# Patient Record
Sex: MALE | Race: BLACK OR AFRICAN AMERICAN | Employment: FULL TIME | URBAN - METROPOLITAN AREA
[De-identification: names, ages, dates, MRNs, and addresses within clinical notes are randomized per-mention and may not be internally consistent; named-entity substitution may affect disease eponyms.]

---

## 2022-04-23 ENCOUNTER — HOSPITAL ENCOUNTER (EMERGENCY)
Facility: CLINIC | Age: 22
Discharge: HOME | End: 2022-04-23
Attending: SPECIALIST
Payer: MEDICAID

## 2022-04-23 VITALS
RESPIRATION RATE: 16 BRPM | TEMPERATURE: 98.5 F | SYSTOLIC BLOOD PRESSURE: 125 MMHG | HEIGHT: 68 IN | BODY MASS INDEX: 27.28 KG/M2 | DIASTOLIC BLOOD PRESSURE: 85 MMHG | OXYGEN SATURATION: 98 % | HEART RATE: 85 BPM | WEIGHT: 180 LBS

## 2022-04-23 DIAGNOSIS — S51.811A LACERATION OF RIGHT FOREARM, INITIAL ENCOUNTER: Primary | ICD-10-CM

## 2022-04-23 PROCEDURE — 12002 RPR S/N/AX/GEN/TRNK2.6-7.5CM: CPT

## 2022-04-23 PROCEDURE — 6370000000 HC RX 637 (ALT 250 FOR IP): Performed by: SPECIALIST

## 2022-04-23 PROCEDURE — 2500000003 HC RX 250 WO HCPCS: Performed by: SPECIALIST

## 2022-04-23 PROCEDURE — 99283 EMERGENCY DEPT VISIT LOW MDM: CPT

## 2022-04-23 RX ORDER — GINSENG 100 MG
CAPSULE ORAL ONCE
Status: COMPLETED | OUTPATIENT
Start: 2022-04-23 | End: 2022-04-23

## 2022-04-23 RX ORDER — LIDOCAINE HYDROCHLORIDE 10 MG/ML
5 INJECTION, SOLUTION INFILTRATION; PERINEURAL ONCE
Status: COMPLETED | OUTPATIENT
Start: 2022-04-23 | End: 2022-04-23

## 2022-04-23 RX ADMIN — BACITRACIN: 500 OINTMENT TOPICAL at 23:18

## 2022-04-23 RX ADMIN — LIDOCAINE HYDROCHLORIDE 5 ML: 10 INJECTION, SOLUTION INFILTRATION; PERINEURAL at 22:35

## 2022-04-23 ASSESSMENT — PAIN DESCRIPTION - LOCATION: LOCATION: ARM

## 2022-04-23 ASSESSMENT — PAIN DESCRIPTION - ORIENTATION: ORIENTATION: RIGHT

## 2022-04-23 ASSESSMENT — PAIN SCALES - GENERAL: PAINLEVEL_OUTOF10: 5

## 2022-04-23 NOTE — Clinical Note
Abby Montaño was seen and treated in our emergency department on 4/23/2022. He may return to work on 04/23/2022. Do not load until sutures are removed     If you have any questions or concerns, please don't hesitate to call.       Bonnie Wells MD

## 2022-04-23 NOTE — Clinical Note
Eduard Cogan was seen and treated in our emergency department on 4/23/2022. He may return to work on 04/23/2022. Do not load until sutures are removed     If you have any questions or concerns, please don't hesitate to call.       Cliff Roy MD

## 2022-04-24 NOTE — ED PROVIDER NOTES
[facility]  [address] [facility]  Phone: [phone]      Pt Name: Donavan Ashby  MRN: [id number]  [place] 2000  Date of evaluation: 4/23/2022      CHIEF COMPLAINT       Chief Complaint   Patient presents with    Laceration         HISTORY OF PRESENT ILLNESS    Donavan Ashby is a 25 y.o. male who presents   Chief Complaint   Patient presents with    Laceration   . 57-year-old male patient presents to the emergency department accompanied by his girlfriend for evaluation of laceration on the volar aspect of the right forearm which occurred when patient tripped on the sidewalk and fell on the glass piece just prior to arrival.  He denies any head injury, loss of consciousness, headache or neck pain. He denies any tingling, numbness or weakness distally. Last tetanus injection was less than 5 years ago. Patient does not take any blood thinners and bleeding has been controlled with localized pressure. He grades pain as 5 out of 10 in intensity. He is right-hand dominant. There are no exacerbating or relieving factors and patient has not taken any medications prior to arrival.    REVIEW OF SYSTEMS       Review of Systems    All systems reviewed and negative unless noted in HPI. The patient denies fever or constitutional symptoms. Denies any neck pain or stiffness. Denies chest pain or shortness of breath. No nausea,  vomiting or diarrhea. Denies any dysuria. Denies urinary frequency or hematuria. Denies any weakness, numbness or focal neurologic deficit. Denies any skin rash or edema. No easy bruising or bleeding. Denies any polyuria, polydypsia       PAST MEDICAL HISTORY    has no past medical history on file. SURGICAL HISTORY      has no past surgical history on file. CURRENT MEDICATIONS     There are no discharge medications for this patient. ALLERGIES     has No Known Allergies.     FAMILY HISTORY     has no family status information on file. family history is not on file. SOCIAL HISTORY      reports that he has never smoked. He has never used smokeless tobacco. He reports current drug use. Drug: Marijuana Andree Aver). PHYSICAL EXAM     INITIAL VITALS:  height is 5' 8\" (1.727 m) and weight is 81.6 kg (180 lb). His oral temperature is 98.5 °F (36.9 °C). His blood pressure is 125/85 and his pulse is 85. His respiration is 16 and oxygen saturation is 98%. Physical Exam  Vitals and nursing note reviewed. Constitutional:       Appearance: He is well-developed. HENT:      Head: Normocephalic and atraumatic. Nose: Nose normal.   Eyes:      Extraocular Movements: Extraocular movements intact. Pupils: Pupils are equal, round, and reactive to light. Cardiovascular:      Rate and Rhythm: Normal rate and regular rhythm. Heart sounds: Normal heart sounds. No murmur heard. Pulmonary:      Effort: Pulmonary effort is normal. No respiratory distress. Breath sounds: Normal breath sounds. Abdominal:      General: Bowel sounds are normal. There is no distension. Palpations: Abdomen is soft. Tenderness: There is no abdominal tenderness. Musculoskeletal:      Right forearm: Laceration present. Cervical back: Normal range of motion and neck supple. Comments: Patient has superficial horizontal two lacerations approximately 5 cm in length. There is no evidence of foreign body, tendon or nerve involvement. Neurovascular examination is intact distally. Skin:     General: Skin is warm and dry. Findings: Laceration present. Neurological:      General: No focal deficit present. Mental Status: He is alert and oriented to person, place, and time.            DIFFERENTIAL DIAGNOSIS/ MDM:     Lacerations right forearm  Will suture repaired under local anesthesia    DIAGNOSTIC RESULTS     EKG: All EKG's are interpreted by the Emergency Department Physician who either signs or Co-signs this chart in the absence of a cardiologist.    None obtained    RADIOLOGY:   I reviewed the radiologist interpretations:  No orders to display       No results found. LABS:  Labs Reviewed - No data to display      EMERGENCY DEPARTMENT COURSE:   Vitals:    Vitals:    04/23/22 2143   BP: 125/85   Pulse: 85   Resp: 16   Temp: 98.5 °F (36.9 °C)   TempSrc: Oral   SpO2: 98%   Weight: 81.6 kg (180 lb)   Height: 5' 8\" (1.727 m)     -------------------------  BP: 125/85, Temp: 98.5 °F (36.9 °C), Heart Rate: 85, Resp: 16    Orders Placed This Encounter   Medications    lidocaine 1 % injection 5 mL    bacitracin ointment       During the emergency department course, lacerations were sutured repaired by myself under local anesthesia. Patient tolerated the procedure well. Please see procedure note. Bacitracin ointment and dressing was applied per nursing staff. Wound care instructions were given. Patient was advised to follow-up with PCP for wound recheck in 2 days, sutures removal in 10 days, take Tylenol and/or ibuprofen as needed for the pain, follow-up with PCP, return if worse. I have reviewed the disposition diagnosis with the patient and or their family/guardian. I have answered their questions and given discharge instructions. They voiced understanding of these instructions and did not have any further questions or complaints. Re-evaluation Notes    Patient is feeling much better and resting comfortably. PROCEDURES:  Laceration Repair Procedure Note    Indication: Laceration    Procedure: The patient was placed in the appropriate position and anesthesia around the lacerations were obtained by infiltration using 1% Lidocaine without epinephrine. The area was then cleansed with betadine and draped in a sterile fashion. The laceration was closed with 5-0 Prolene using interrupted sutures. A second laceration was closed with 5-0 Prolene using interrupted sutures.  The wound area was then dressed with bacitracin and a sterile dressing. Total repaired wound length: 5 cm. Other Items: Suture count: 8    The patient tolerated the procedure well. Complications: None        FINAL IMPRESSION      1. Laceration of right forearm, initial encounter          DISPOSITION/PLAN   DISPOSITION Decision To Discharge 04/23/2022 11:05:18 PM      Condition on Disposition    stable    PATIENT REFERRED TO:  Call [phone]-day for follow up    Call   For wound re-check in 2 days and suture removal in 10 days      DISCHARGE MEDICATIONS:  There are no discharge medications for this patient. (Please note that portions of this note were completed with a voice recognition program.  Efforts were made to edit the dictations but occasionally words are mis-transcribed.)    Zaid Collins MD,, MD, F.A.C.E.P.   Attending Emergency Physician     Zaid Collins MD  04/24/22 4673

## 2022-05-03 ENCOUNTER — HOSPITAL ENCOUNTER (EMERGENCY)
Facility: CLINIC | Age: 22
Discharge: HOME | End: 2022-05-03
Attending: EMERGENCY MEDICINE
Payer: MEDICAID

## 2022-05-03 VITALS
OXYGEN SATURATION: 98 % | TEMPERATURE: 98.4 F | SYSTOLIC BLOOD PRESSURE: 130 MMHG | HEART RATE: 78 BPM | RESPIRATION RATE: 20 BRPM | DIASTOLIC BLOOD PRESSURE: 59 MMHG

## 2022-05-03 DIAGNOSIS — Z48.02 VISIT FOR SUTURE REMOVAL: Primary | ICD-10-CM

## 2022-05-03 PROCEDURE — 99282 EMERGENCY DEPT VISIT SF MDM: CPT

## 2022-05-03 ASSESSMENT — ENCOUNTER SYMPTOMS
COLOR CHANGE: 0
SHORTNESS OF BREATH: 0

## 2022-05-03 NOTE — ED PROVIDER NOTES
[address] [facility] ED  Emergency Department  Faculty Attestation     Pt Name: Sumanth Chin  MRN: [id number]  [place] 2000  Date of evaluation: 5/3/22    I was personally available for consultation in the Emergency Department. Have reviewed everything on the chart that is available and agree with the documentation provided by the [facility] AND [facility], including discussion about the assessment, treatment plan and disposition. Sumanth Chin is a 25 y.o. male who presents with Suture / Staple Removal      Vitals:   Vitals:    05/03/22 1201 05/03/22 1203   BP:  (!) 130/59   Pulse: 78    Resp: 20    Temp: 98.4 °F (36.9 °C)    TempSrc: Oral    SpO2: 98%        Impression:   1.  Visit for suture removal        Cole Ashford MD, [place]  Attending Emergency  Physician    (Please note that portions of this note were completed with a voice recognition program.  Efforts were made to edit the dictations but occasionally words are mis-transcribed.)      Eugenio Marks MD  05/03/22 1210

## 2022-05-03 NOTE — ED NOTES
Pt to the ED for suture removal. Pt has 8 sutures that were placed ten days ago. Site is healing well with no complications.  Pt denies any other needs     Willian Billings RN  05/03/22 9656

## 2022-05-03 NOTE — ED PROVIDER NOTES
[facility]  [address] [facility]  Phone: [phone]        Pt Name: Thiago Levy  MRN: [id number]  [place] 2000  Date of evaluation: 5/3/22    CHIEFCOMPLAINT       Chief Complaint   Patient presents with    Suture / Staple Removal       HISTORY OF PRESENT ILLNESS (Location/Symptom, Timing/Onset, Context/Setting, Quality, Duration, Modifying Factors, Severity)      Thiago Levy is a 25 y.o. male with no pertinent PMH who presents to the ED via private auto for suture removal.  Patient states that he had 8 sutures placed approximate 10 days ago to the right forearm after he cut on glass. He denies any drainage, fevers, redness, tenderness. Denies any current complications from site. Patient states that his tetanus is up-to-date. PAST MEDICAL / SURGICAL / SOCIAL / FAMILY HISTORY     PMH:  has no past medical history on file. Surgical History:  has no past surgical history on file. Social History:  reports that he has never smoked. He has never used smokeless tobacco. He reports current drug use. Drug: Marijuana Bernice Beat). Family History: has no family status information on file. family history is not on file. Psychiatric History: None    Allergies: Patient has no known allergies. Home Medications:   Prior to Admission medications    Not on File       REVIEW OF SYSTEMS  (2-9 systems for level 4, 10 ormore for level 5)      Review of Systems   Constitutional: Negative for chills and fever. Respiratory: Negative for shortness of breath. Cardiovascular: Negative for chest pain. Skin: Positive for wound. Negative for color change. All other systems negative except as marked. PHYSICAL EXAM  (up to 7 for level 4, 8 or more for level 5)      INITIAL VITALS:  oral temperature is 98.4 °F (36.9 °C). His blood pressure is 130/59 (abnormal) and his pulse is 78. His respiration is 20 and oxygen saturation is 98%. Vital signs reviewed.     Physical Exam  Constitutional:       General: He is not in acute distress. Appearance: Normal appearance. HENT:      Head: Normocephalic. Neck:      Trachea: No tracheal deviation. Musculoskeletal:      Cervical back: Neck supple. Right lower leg: No edema. Left lower leg: No edema. Skin:     General: Skin is warm and dry. Capillary Refill: Capillary refill takes less than 2 seconds. Neurological:      Mental Status: He is alert. Psychiatric:         Mood and Affect: Mood normal.         Behavior: Behavior normal.           DIFFERENTIAL DIAGNOSIS / MDM     I removed a intact sutures patient's laceration to right forearm. Wounds are well-healing, there was no bleeding, bacitracin was applied. Plan discharge patient home to follow-up with PCP as needed. Instructed patient to apply triple antibiotic ointment daily for a few days. Instructed patient to return to the ER with any swelling, pain, redness, drainage to site. Patient is agreement this plan at this time. All question concerns were answered at this time    At this time the patient is without objective evidence of an acute process requiring hospitalization or inpatient management. They have remained hemodynamically stable throughout their entire ED visit and are stable for discharge with outpatient follow-up. The patient understands that at this time there is no evidence for a more malignant underlying process, but the patient also understands that early in the process of an illness or injury, an emergency department workup can be falsely reassuring. Routine discharge counseling was given, and the patient understands that worsening, changing or persistent symptoms should prompt an immediate call or follow up with their primary physician or return to the emergency department. The importance of appropriate follow up was also discussed. I have reviewed the disposition diagnosis with the patient and or their family/guardian. I have answered their questions and given discharge instructions. They voiced understanding of these instructions and did not have any further questions or complaints. PLAN (LABS / IMAGING / EKG):  No orders of the defined types were placed in this encounter. MEDICATIONS ORDERED:  No orders of the defined types were placed in this encounter. Controlled Substances Monitoring:     DIAGNOSTIC RESULTS     EKG: All EKG's are interpreted by the Emergency Department Physician who either signs or Co-signs this chart in the absenceof a cardiologist.        RADIOLOGY: All images are read by the radiologist and their interpretations are reviewed. No orders to display       No results found. LABS:  No results found for this visit on 05/03/22. EMERGENCY DEPARTMENT COURSE           Vitals:    Vitals:    05/03/22 1201 05/03/22 1203   BP:  (!) 130/59   Pulse: 78    Resp: 20    Temp: 98.4 °F (36.9 °C)    TempSrc: Oral    SpO2: 98%      -------------------------  BP: (!) 130/59, Temp: 98.4 °F (36.9 °C), Heart Rate: 78, Resp: 20      RE-EVALUATION:  See ED Course notes above. CONSULTS:  None    PROCEDURES:    Suture/ Staple Removal Procedure Note  Indication: Wound healed    Procedure: The patient was placed in the appropriate position and the sutures were removed without difficulty. Other items: the wound appears well healed    The patient tolerated the procedure well. Complications: None              FINAL IMPRESSION      1. Visit for suture removal          DISPOSITION / PLAN     CONDITION ON DISPOSITION:   Good for discharge.      PATIENT REFERRED TO:  Anne Loving MD  [facility] [address] [facility] [address] [facility]  [phone]      As needed    [facility]  [address]  [phone]    If symptoms worsen      DISCHARGE MEDICATIONS:  New Prescriptions    No medications on file       Evi Youngblood, APRN - [id number] [facility]   Emergency Medicine Nurse Practitioner    (Please note that portions of this note were completed with a voice recognition program.  Efforts were made to edit the dictations but occasionally words aremis-transcribed.)       STEFANIE Nava - DUARTE  05/03/22 9081

## 2024-07-21 ENCOUNTER — APPOINTMENT (OUTPATIENT)
Dept: GENERAL RADIOLOGY | Facility: CLINIC | Age: 24
End: 2024-07-21

## 2024-07-21 ENCOUNTER — HOSPITAL ENCOUNTER (EMERGENCY)
Facility: CLINIC | Age: 24
Discharge: HOME | End: 2024-07-21
Attending: EMERGENCY MEDICINE

## 2024-07-21 VITALS
WEIGHT: 187 LBS | HEART RATE: 75 BPM | RESPIRATION RATE: 18 BRPM | SYSTOLIC BLOOD PRESSURE: 148 MMHG | TEMPERATURE: 98.3 F | HEIGHT: 69 IN | OXYGEN SATURATION: 97 % | DIASTOLIC BLOOD PRESSURE: 92 MMHG | BODY MASS INDEX: 27.7 KG/M2

## 2024-07-21 DIAGNOSIS — M70.51 SUPRAPATELLAR BURSITIS OF RIGHT KNEE: ICD-10-CM

## 2024-07-21 DIAGNOSIS — M25.561 ACUTE PAIN OF RIGHT KNEE: Primary | ICD-10-CM

## 2024-07-21 PROCEDURE — 99283 EMERGENCY DEPT VISIT LOW MDM: CPT

## 2024-07-21 PROCEDURE — 73562 X-RAY EXAM OF KNEE 3: CPT

## 2024-07-21 NOTE — DISCHARGE INSTR - COC
continuing treatment of the diagnosis listed and that he requires {Admit to Appropriate Level of Care:90425} for {GREATER/LESS:210373529} 30 days.     Update Admission H&P: {CHP DME Changes in HandP:451913674}    PHYSICIAN SIGNATURE:  {Esignature:[id number]}

## 2024-07-21 NOTE — DISCHARGE INSTRUCTIONS
Motrin and/or Tylenol as needed for pain  Ace wrap for comfort and support  Ice as needed  Follow-up with family physician for reevaluation and to discuss possible need for an MRI if not improving    Return immediately if any worsening symptoms or any other concerns    Please understand that early in the process of an illness or injury, an emergency department workup can be falsely reassuring.      Tell us how we did visit: [email or web address]   and let us know about your experience

## 2024-07-21 NOTE — ED PROVIDER NOTES
malignant underlying process, but also understands that early in the process of an illness or injury, an emergency department workup can be falsely reassuring.  Routine discharge counseling was given, and it is understood that worsening, changing or persistent symptoms should prompt an immediate call or follow up with their primary physician or return to the emergency department. The importance of appropriate follow up was also discussed.  I have reviewed the disposition diagnosis.  I have answered the questions and given discharge instructions.  There was voiced understanding of these instructions and no further questions or complaints.    FINAL IMPRESSION      1. Acute pain of right knee    2. Suprapatellar bursitis of right knee          DISPOSITION/PLAN   DISPOSITION Decision To Discharge 07/21/2024 10:38:08 AM        CONDITION ON DISPOSITION: STABLE       PATIENT REFERRED TO:  Russel Orosco MD  [address] [place] [address], [address]  [place] [address]  [phone]    Schedule an appointment as soon as possible for a visit in 3 days        DISCHARGE MEDICATIONS:  There are no discharge medications for this patient.      (Please note that portions of this note were completed with a voice recognition program.  Efforts were made to edit the dictations but occasionally words are mis-transcribed.  Additionally, portions of this note may also include information that was incorporated after care transfer to another provider that were not available at the time of my evaluation.  Some of this information could likely include laboratory values, vital sign updates, medications etc.)    Philippe Gaines DO   Attending Emergency Physician      Philippe Gaines DO  07/21/24 9765